# Patient Record
Sex: MALE | Race: WHITE | Employment: UNEMPLOYED | URBAN - METROPOLITAN AREA
[De-identification: names, ages, dates, MRNs, and addresses within clinical notes are randomized per-mention and may not be internally consistent; named-entity substitution may affect disease eponyms.]

---

## 2022-10-01 ENCOUNTER — HOSPITAL ENCOUNTER (EMERGENCY)
Age: 67
Discharge: HOME | End: 2022-10-01
Payer: MEDICARE

## 2022-10-01 VITALS
RESPIRATION RATE: 16 BRPM | WEIGHT: 187.17 LBS | HEART RATE: 78 BPM | BODY MASS INDEX: 26.8 KG/M2 | TEMPERATURE: 98.5 F | OXYGEN SATURATION: 100 % | DIASTOLIC BLOOD PRESSURE: 76 MMHG | HEIGHT: 70 IN | SYSTOLIC BLOOD PRESSURE: 143 MMHG

## 2022-10-01 DIAGNOSIS — U07.1 COVID-19 VIRUS INFECTION: Primary | ICD-10-CM

## 2022-10-01 DIAGNOSIS — E86.0 DEHYDRATION: ICD-10-CM

## 2022-10-01 DIAGNOSIS — R11.2 NAUSEA AND VOMITING, UNSPECIFIED VOMITING TYPE: ICD-10-CM

## 2022-10-01 LAB
RAPID INFLUENZA  B AGN: NEGATIVE
RAPID INFLUENZA A AGN: NEGATIVE
SARS-COV-2, NAAT: DETECTED

## 2022-10-01 PROCEDURE — 99284 EMERGENCY DEPT VISIT MOD MDM: CPT

## 2022-10-01 PROCEDURE — 96361 HYDRATE IV INFUSION ADD-ON: CPT

## 2022-10-01 PROCEDURE — 87635 SARS-COV-2 COVID-19 AMP PRB: CPT

## 2022-10-01 PROCEDURE — 2580000003 HC RX 258: Performed by: PHYSICIAN ASSISTANT

## 2022-10-01 PROCEDURE — 87804 INFLUENZA ASSAY W/OPTIC: CPT

## 2022-10-01 PROCEDURE — 96374 THER/PROPH/DIAG INJ IV PUSH: CPT

## 2022-10-01 PROCEDURE — 96375 TX/PRO/DX INJ NEW DRUG ADDON: CPT

## 2022-10-01 PROCEDURE — 6360000002 HC RX W HCPCS: Performed by: PHYSICIAN ASSISTANT

## 2022-10-01 RX ORDER — METHOCARBAMOL 100 MG/ML
500 INJECTION, SOLUTION INTRAMUSCULAR; INTRAVENOUS ONCE
Status: COMPLETED | OUTPATIENT
Start: 2022-10-01 | End: 2022-10-01

## 2022-10-01 RX ORDER — ONDANSETRON 2 MG/ML
4 INJECTION INTRAMUSCULAR; INTRAVENOUS ONCE
Status: COMPLETED | OUTPATIENT
Start: 2022-10-01 | End: 2022-10-01

## 2022-10-01 RX ORDER — KETOROLAC TROMETHAMINE 30 MG/ML
15 INJECTION, SOLUTION INTRAMUSCULAR; INTRAVENOUS ONCE
Status: COMPLETED | OUTPATIENT
Start: 2022-10-01 | End: 2022-10-01

## 2022-10-01 RX ORDER — NIRMATRELVIR AND RITONAVIR 300-100 MG
KIT ORAL
Qty: 30 TABLET | Refills: 0 | Status: SHIPPED | OUTPATIENT
Start: 2022-10-01 | End: 2022-10-06

## 2022-10-01 RX ORDER — METHOCARBAMOL 500 MG/1
500 TABLET, FILM COATED ORAL EVERY 8 HOURS PRN
Qty: 12 TABLET | Refills: 0 | Status: SHIPPED | OUTPATIENT
Start: 2022-10-01 | End: 2022-10-05

## 2022-10-01 RX ORDER — 0.9 % SODIUM CHLORIDE 0.9 %
1000 INTRAVENOUS SOLUTION INTRAVENOUS ONCE
Status: COMPLETED | OUTPATIENT
Start: 2022-10-01 | End: 2022-10-01

## 2022-10-01 RX ORDER — ONDANSETRON 4 MG/1
4 TABLET, ORALLY DISINTEGRATING ORAL EVERY 8 HOURS PRN
Qty: 20 TABLET | Refills: 0 | Status: SHIPPED | OUTPATIENT
Start: 2022-10-01

## 2022-10-01 RX ADMIN — METHOCARBAMOL 500 MG: 100 INJECTION INTRAMUSCULAR; INTRAVENOUS at 21:46

## 2022-10-01 RX ADMIN — SODIUM CHLORIDE 1000 ML: 9 INJECTION, SOLUTION INTRAVENOUS at 21:48

## 2022-10-01 RX ADMIN — ONDANSETRON 4 MG: 2 INJECTION INTRAMUSCULAR; INTRAVENOUS at 21:45

## 2022-10-01 RX ADMIN — KETOROLAC TROMETHAMINE 15 MG: 30 INJECTION, SOLUTION INTRAMUSCULAR at 21:46

## 2022-10-01 ASSESSMENT — PAIN DESCRIPTION - LOCATION
LOCATION: ABDOMEN
LOCATION: ABDOMEN

## 2022-10-01 ASSESSMENT — PAIN DESCRIPTION - DESCRIPTORS: DESCRIPTORS: PATIENT UNABLE TO DESCRIBE

## 2022-10-01 ASSESSMENT — PAIN SCALES - GENERAL
PAINLEVEL_OUTOF10: 8
PAINLEVEL_OUTOF10: 5

## 2022-10-02 NOTE — DISCHARGE INSTRUCTIONS
Home in stable condition to stay hydrated, use medications as discussed, quarantine, and monitor for gradual improvement. Call your doctor for further care and treatment 24-72 hours. Return to ER for difficulty getting air, unable to take liquids, acute mental status change, or other acute worsening or concern.

## 2022-10-02 NOTE — ED NOTES
Discharge and education instructions reviewed. Patient verbalized understanding, teach-back successful. Patient denied questions at this time. No acute distress noted. Patient instructed to follow-up as noted - return to emergency department if symptoms worsen. Patient verbalized understanding. Discharged per EDMD with discharge instructions.         Evan Solis RN  10/01/22 3432

## 2022-10-02 NOTE — ED PROVIDER NOTES
[address] [facility]        Pt Name: Mary Leigh  MRN: [id number]  [place] 1955  Date of evaluation: 10/1/2022  Provider: Zachary Bird  PCP: [facility]  Note Started: 11:25 PM EDT      JAMILAH. I have evaluated this patient. My supervising physician was available for consultation. Triage CHIEF COMPLAINT       Chief Complaint   Patient presents with    Emesis     Pt had fever yesterday and emesis today. HISTORY OF PRESENT ILLNESS   (Location/Symptom, Timing/Onset, Context/Setting, Quality, Duration, Modifying Factors, Severity)  Note limiting factors. Chief Complaint: Fevers and body aches yesterday, nausea and vomiting throughout the whole day today and feeling dehydrated    Mary Leigh is a 73 y.o. male who presents indicating that he hurts all over including headache and backache and general malaise and fatigue. He is also feeling a bit dehydrated. He has been vomiting nearly constantly throughout the whole day. Finally he found a Zofran tablet and took a just before coming into the ER and is not not actively heaving or vomiting. No significant runny or stuffy nose. No cough or congestion or chest pain or shortness of breath. No syncope or near syncope or extremity acute loss range of motion or strength or sensation or rash    Nursing Notes were all reviewed and agreed with or any disagreements were addressed in the HPI. REVIEW OF SYSTEMS    (2-9 systems for level 4, 10 or more for level 5)     Review of Systems  Positive for symptoms as above with fevers and chills headache, no vision change or neck pain or stiffness. No worst of life headache or sudden onset or thunderclap. No sore throat or any chest pain or shortness of breath dizziness or confusion syncope or near syncope. Positive for nausea and vomiting, no abdominal pain at this time. No acute urine or stool changes noted. No extremity acute weakness or loss of range of motion or strength. No rash. PAST MEDICAL HISTORY     Past Medical History:   Diagnosis Date    Hypertension        SURGICAL HISTORY     Past Surgical History:   Procedure Laterality Date    BACK SURGERY         CURRENTMEDICATIONS       Previous Medications    No medications on file       ALLERGIES     Patient has no known allergies. FAMILYHISTORY     No family history on file. SOCIAL HISTORY       Social History     Socioeconomic History    Marital status: Single       SCREENINGS    [place] Coma Scale  Eye Opening: Spontaneous  Best Verbal Response: Oriented  Best Motor Response: Obeys commands  [place] Coma Scale Score: 15        PHYSICAL EXAM    (up to 7 for level 4, 8 or more for level 5)     ED Triage Vitals [10/01/22 2036]   BP Temp Temp Source Heart Rate Resp SpO2 Height Weight   (!) 181/96 98.5 °F (36.9 °C) Oral 85 (!) 85 95 % 5' 10\" (1.778 m) 187 lb 2.7 oz (84.9 kg)       Physical Exam  Vitals and nursing note reviewed. Constitutional:       Appearance: Normal appearance. He is not diaphoretic. HENT:      Head: Normocephalic and atraumatic. Right Ear: External ear normal.      Left Ear: External ear normal.      Nose: Nose normal.      Mouth/Throat:      Mouth: Mucous membranes are moist.      Pharynx: No posterior oropharyngeal erythema. Eyes:      General:         Right eye: No discharge. Left eye: No discharge. Conjunctiva/sclera: Conjunctivae normal.   Cardiovascular:      Rate and Rhythm: Normal rate and regular rhythm. Pulses: Normal pulses. Heart sounds: Normal heart sounds. No murmur heard. No gallop. Pulmonary:      Effort: Pulmonary effort is normal. No respiratory distress. Breath sounds: Normal breath sounds. No wheezing, rhonchi or rales. Abdominal:      General: Bowel sounds are normal. There is no distension. Palpations: Abdomen is soft. Tenderness:  There is no abdominal tenderness. There is no right CVA tenderness or left CVA tenderness. Musculoskeletal:         General: Normal range of motion. Cervical back: Normal range of motion and neck supple. No rigidity. Right lower leg: No edema. Left lower leg: No edema. Lymphadenopathy:      Cervical: No cervical adenopathy. Skin:     General: Skin is warm and dry. Capillary Refill: Capillary refill takes less than 2 seconds. Neurological:      Mental Status: He is alert and oriented to person, place, and time. Mental status is at baseline. Sensory: No sensory deficit. Coordination: Coordination normal.   Psychiatric:         Mood and Affect: Mood normal.         Behavior: Behavior normal.       DIAGNOSTIC RESULTS   LABS:    Labs Reviewed   COVID-19, RAPID - Abnormal; Notable for the following components:       Result Value    SARS-CoV-2, NAAT DETECTED (*)     All other components within normal limits   RAPID INFLUENZA A/B ANTIGENS       When ordered, only abnormal lab results are displayed. All other labs were within normal range or not returned as of this dictation. EKG: When ordered, EKG's are interpreted by the Emergency Department Physician in the absence of a cardiologist.  Please see their note for interpretation of EKG. RADIOLOGY:   Non-plain film images such as CT, Ultrasound and MRI are read by the radiologist. Plain radiographic images are visualized andpreliminarily interpreted by the  ED Provider with the below findings:        Interpretation perthe Radiologist below, if available at the time of this note:    No orders to display     No results found.       PROCEDURES   Unless otherwise noted below, none     Procedures    CRITICAL CARE TIME   N/A    CONSULTS:  None      EMERGENCY DEPARTMENT COURSE and DIFFERENTIAL DIAGNOSIS/MDM:   Vitals:    Vitals:    10/01/22 2145 10/01/22 2200 10/01/22 2215 10/01/22 2245   BP: (!) 167/78 (!) 149/81 (!) 149/60 (!) 159/83   Pulse: 80 66 66 84 Resp: 14 14 12 16   Temp:       TempSrc:       SpO2: 97% 94% 93% 96%   Weight:       Height:           Patient was given thefollowing medications:  Medications   0.9 % sodium chloride bolus (0 mLs IntraVENous Stopped 10/1/22 2300)   ondansetron (ZOFRAN) injection 4 mg (4 mg IntraVENous Given 10/1/22 2145)   methocarbamol (ROBAXIN) injection 500 mg (500 mg IntraVENous Given 10/1/22 2146)   ketorolac (TORADOL) injection 15 mg (15 mg IntraVENous Given 10/1/22 2146)         Is this patient to be included in the SEP-1 Core Measure due to severe sepsis or septic shock? No   Exclusion criteria - the patient is NOT to be included for SEP-1 Core Measure due to:  2+ SIRS criteria are not met    Patient presents as above and evaluation and treatment is begun here. He does have a COVID-19 swab taken as well as a influenza swab. The COVID-19 swab comes back positive. Patient reportedly has been vomiting all day and is feeling a bit dehydrated. Therefore medications for comfort are ordered for IV as well as some IV fluids. Patient does tolerate this quite well. On recheck patient states that he is feeling significantly better. No longer nauseated. Significantly less pain. Overall he is feeling quite a bit improved. No suspicion or indication of acute system compromise at this time and conservative home care now discussed and recommended to patient and family who verbalized understanding and agreement with the above and the following discharge home plan. Home in stable condition to stay hydrated, use medications as discussed, quarantine, and monitor for gradual improvement. Call your doctor for further care and treatment 24-72 hours. Return to ER for difficulty getting air, unable to take liquids, acute mental status change, or other acute worsening or concern. I am the Primary Clinician of Record. FINAL IMPRESSION      1. COVID-19 virus infection    2. Nausea and vomiting, unspecified vomiting type    3. Dehydration          DISPOSITION/PLAN   DISPOSITION Decision To Discharge 10/01/2022 11:18:46 PM      PATIENT REFERREDTO:  Cherry Greco MD  [place] [address]  [phone]            DISCHARGE MEDICATIONS:  New Prescriptions    METHOCARBAMOL (ROBAXIN) 500 MG TABLET    Take 1 tablet by mouth every 8 hours as needed (spasm) Caution, causes drowsiness. Take appropriate care    NIRMATRELVIR/RITONAVIR (PAXLOVID, 300/100,) 20 X 150 MG & 10 X 100MG TBPK    Take 3 tablets (two 150 mg nirmatrelvir and one 100 mg ritonavir tablets) by mouth every 12 hours for 5 days.     ONDANSETRON (ZOFRAN ODT) 4 MG DISINTEGRATING TABLET    Take 1 tablet by mouth every 8 hours as needed for Nausea       DISCONTINUED MEDICATIONS:  Discontinued Medications    No medications on file              (Please note that portions ofthis note were completed with a voice recognition program.  Efforts were made to edit the dictations but occasionally words are mis-transcribed.)    Libby Jaeger PA-C (electronically signed)              Libby Jaeger PA-C  10/01/22 6213

## 2022-10-02 NOTE — ED TRIAGE NOTES
Pt states that yesterday he has had a fever and puking today. Pt did not test for covid at home. Pt is vaccinated for covid. Pt has back pain that is more painful. Pt is accompanied by former significant other. Pt reluctant to answer questions because he does not feel well. Pt initially did not want Covid swab, but then agreed to swab. Pt denies confusion, numbness, tingling in extremities, but states he feels out of from not feeling well today.

## 2022-11-18 ENCOUNTER — APPOINTMENT (OUTPATIENT)
Dept: GENERAL RADIOLOGY | Age: 67
End: 2022-11-18
Payer: MEDICARE

## 2022-11-18 ENCOUNTER — HOSPITAL ENCOUNTER (EMERGENCY)
Age: 67
Discharge: HOME | End: 2022-11-18
Payer: MEDICARE

## 2022-11-18 VITALS
BODY MASS INDEX: 27.84 KG/M2 | DIASTOLIC BLOOD PRESSURE: 88 MMHG | SYSTOLIC BLOOD PRESSURE: 157 MMHG | HEIGHT: 70 IN | WEIGHT: 194.45 LBS | OXYGEN SATURATION: 97 % | HEART RATE: 79 BPM | TEMPERATURE: 98.1 F | RESPIRATION RATE: 16 BRPM

## 2022-11-18 DIAGNOSIS — S62.114A CLOSED NONDISPLACED FRACTURE OF TRIQUETRUM OF RIGHT WRIST, INITIAL ENCOUNTER: Primary | ICD-10-CM

## 2022-11-18 PROCEDURE — 73130 X-RAY EXAM OF HAND: CPT

## 2022-11-18 PROCEDURE — 29125 APPL SHORT ARM SPLINT STATIC: CPT

## 2022-11-18 PROCEDURE — 73110 X-RAY EXAM OF WRIST: CPT

## 2022-11-18 PROCEDURE — 99283 EMERGENCY DEPT VISIT LOW MDM: CPT

## 2022-11-18 PROCEDURE — 6370000000 HC RX 637 (ALT 250 FOR IP): Performed by: NURSE PRACTITIONER

## 2022-11-18 RX ORDER — HYDROCODONE BITARTRATE AND ACETAMINOPHEN 5; 325 MG/1; MG/1
1 TABLET ORAL ONCE
Status: DISCONTINUED | OUTPATIENT
Start: 2022-11-18 | End: 2022-11-18 | Stop reason: HOSPADM

## 2022-11-18 RX ORDER — NAPROXEN 500 MG/1
500 TABLET ORAL 2 TIMES DAILY
Qty: 28 TABLET | Refills: 0 | Status: SHIPPED | OUTPATIENT
Start: 2022-11-18 | End: 2022-12-02

## 2022-11-18 RX ORDER — NAPROXEN 250 MG/1
500 TABLET ORAL ONCE
Status: COMPLETED | OUTPATIENT
Start: 2022-11-18 | End: 2022-11-18

## 2022-11-18 RX ORDER — HYDROCODONE BITARTRATE AND ACETAMINOPHEN 5; 325 MG/1; MG/1
1 TABLET ORAL EVERY 6 HOURS PRN
Qty: 12 TABLET | Refills: 0 | Status: SHIPPED | OUTPATIENT
Start: 2022-11-18 | End: 2022-11-21

## 2022-11-18 RX ORDER — AMLODIPINE BESYLATE 5 MG/1
2.5 TABLET ORAL
COMMUNITY
Start: 2022-01-06

## 2022-11-18 RX ORDER — GABAPENTIN 300 MG/1
900 CAPSULE ORAL
COMMUNITY
Start: 2022-01-27

## 2022-11-18 RX ORDER — ACETAMINOPHEN 500 MG
1000 TABLET ORAL ONCE
Status: COMPLETED | OUTPATIENT
Start: 2022-11-18 | End: 2022-11-18

## 2022-11-18 RX ORDER — ACETAMINOPHEN 500 MG
1000 TABLET ORAL 3 TIMES DAILY PRN
Qty: 180 TABLET | Refills: 0 | Status: SHIPPED | OUTPATIENT
Start: 2022-11-18 | End: 2022-11-18

## 2022-11-18 RX ADMIN — NAPROXEN 500 MG: 250 TABLET ORAL at 11:18

## 2022-11-18 RX ADMIN — ACETAMINOPHEN 1000 MG: 500 TABLET ORAL at 11:18

## 2022-11-18 ASSESSMENT — PAIN - FUNCTIONAL ASSESSMENT: PAIN_FUNCTIONAL_ASSESSMENT: 0-10

## 2022-11-18 ASSESSMENT — PAIN SCALES - GENERAL
PAINLEVEL_OUTOF10: 4
PAINLEVEL_OUTOF10: 4
PAINLEVEL_OUTOF10: 6

## 2022-11-18 ASSESSMENT — PAIN DESCRIPTION - LOCATION
LOCATION: WRIST
LOCATION: ARM;WRIST

## 2022-11-18 ASSESSMENT — LIFESTYLE VARIABLES
HOW MANY STANDARD DRINKS CONTAINING ALCOHOL DO YOU HAVE ON A TYPICAL DAY: PATIENT DOES NOT DRINK
HOW OFTEN DO YOU HAVE A DRINK CONTAINING ALCOHOL: NEVER

## 2022-11-18 ASSESSMENT — PAIN DESCRIPTION - ORIENTATION
ORIENTATION: RIGHT
ORIENTATION: RIGHT

## 2022-11-18 NOTE — DISCHARGE INSTRUCTIONS
You have been prescribed medication, norco, that causes drowsiness. While this is not a problem under normal circumstances, when taken with alcohol or while driving or operating heavy machinery, this medicine could cause you to become too sleepy and/or hurt yourself or others. Therefore, it is very important that you DO NOT DRIVE, DRINK ALCOHOL, OR OPERATE HEAVY MACHINERY WHILE TAKING THE MEDICINE(S) LISTED ABOVE.

## 2022-11-18 NOTE — ED PROVIDER NOTES
[address]  [address] [address]  Dept: [phone]  Loc: [address] ENCOUNTER        This patient was not seen or evaluated by the attending physician. I evaluated this patient, the attending physician was available for consultation. CHIEF COMPLAINT    Chief Complaint   Patient presents with    Wrist Pain     4:00 pm yesterday was doing brakes on truck, tripped of equipment and caught himself with his hands and hurt his wrists, mainly right wrist. Barely scraped nose on concrete, denies taking blood thinners. HPI    Allegra Herrera is a 73 y.o. male who presents with right wrist and hand pain. The onset was yesterday. The duration has been constant since the onset. The quality of the pain is sharp and the severity is 4/10. The patient has no other associated injury. The context is states that he was chasing his dog that got out of the house, he tripped over a car nelda and fell with his hands extended outward. He states that \"my right side took the brunt of it. \"  Did not hit his head or lose consciousness. Has pain in the right wrist and hand. No numbness or tingling distal to site of injury. Came to the ED for further evaluation and treatment    REVIEW OF SYSTEMS    Skin: No lacerations or puncture wounds  Musculoskeletal: see HPI, no other joint or bony injury or pain  Neurologic: No loss of consciousness, no head injury, no paresthesias or focal distal extremity weakness  All other systems reviewed and are negative.     PAST MEDICAL & SURGICAL HISTORY    Past Medical History:   Diagnosis Date    Hypertension      Past Surgical History:   Procedure Laterality Date    BACK SURGERY         CURRENT MEDICATIONS  (may include discharge medications prescribed in the ED)  Current Outpatient Rx   Medication Sig Dispense Refill    amLODIPine (NORVASC) 5 MG tablet 2.5 mg      gabapentin (NEURONTIN) 300 MG capsule 900 mg.      naproxen (NAPROSYN) 500 MG tablet Take 1 tablet by mouth 2 times daily for 14 days 28 tablet 0    HYDROcodone-acetaminophen (NORCO) 5-325 MG per tablet Take 1 tablet by mouth every 6 hours as needed for Pain for up to 3 days. Intended supply: 3 days. Take lowest dose possible to manage pain 12 tablet 0       ALLERGIES    Allergies   Allergen Reactions    Lisinopril      Other reaction(s): Mood swings    Oxycodone-Acetaminophen      Other reaction(s): Weight gain    Pregabalin      Other reaction(s): Mood swings, Depressive disorder, Mood swings, Depressive disorder    Codeine Itching       SOCIAL & FAMILY HISTORY    Social History     Socioeconomic History    Marital status: Single     Spouse name: None    Number of children: None    Years of education: None    Highest education level: None   Tobacco Use    Smoking status: Never    Smokeless tobacco: Never     History reviewed. No pertinent family history. PHYSICAL EXAM    VITAL SIGNS: BP (!) 157/88   Pulse 79   Temp 98.1 °F (36.7 °C) (Oral)   Resp 16   Ht 5' 10\" (1.778 m)   Wt 194 lb 7.1 oz (88.2 kg)   SpO2 97%   BMI 27.90 kg/m²   Constitutional:  Well nourished, no acute distress  HENT:  Atraumatic, moist mucous membranes  NECK: normal range of motion,  supple   Respiratory:  No respiratory distress  Cardiovascular:  No JVD  Vascular: right radial pulse 2+, capillary refill less than 2 seconds  Musculoskeletal:  +\ tenderness to palpation of the ulnar aspect of the right wrist with overlying right wrist edema and right hand edema. No overt deformity   integument:  Well hydrated, no skin lacerations, no erythema  Neurologic:  Awake alert, no slurred speech, sensory and motor intact    RADIOLOGY   XR HAND RIGHT (MIN 3 VIEWS)   Final Result   Suspected pattern of triquetral fracture appreciated on lateral imaging of   the wrist.  Probable associated soft tissue swelling.          XR WRIST RIGHT (MIN 3 VIEWS)   Final Result   Suspected pattern of triquetral fracture appreciated on lateral imaging of   the wrist.  Probable associated soft tissue swelling. PROCEDURES  SPLINT PLACEMENT  A volar OCL splint was applied to the affected limb by the emergency department technician. I evaluated the splint after it was applied. The splint was in good position. The patient's extremity was neurovascularly intact after the splint placement. ED COURSE & MEDICAL DECISION MAKING   See chart for medications given during emergency department course. Differential diagnosis: Arterial Injury/Ischemia, Fracture, Dislocation, Infection, Compartment Syndrome, Neurologic Deficit/Injury, ligamental injury, musculoskeletal pain, other    No evidence of neurovascular injury on exam.  Plain films as above. I spoke with Lu, NP with the orthopedic group. Deferring CT until follow-up. We will place a volar splint, see above, patient tolerated well no immediate complication is neurovascularly intact after splint placement. No results found for this visit on 11/18/22. I estimate there is LOW risk for COMPARTMENT SYNDROME, DEEP VENOUS THROMBOSIS, SEPTIC ARTHRITIS, TENDON OR NEUROVASCULAR INJURY, thus I consider the discharge disposition reasonable. Jerome Young and I have discussed the diagnosis and risks, and we agree with discharging home to follow-up with their primary doctor or the referral orthopedist. We also discussed returning to the Emergency Department immediately if new or worsening symptoms occur. We have discussed the symptoms which are most concerning (e.g., changing or worsening pain, numbness, weakness) that necessitate immediate return. The patient verbalized understanding, have no further questions or concerns and are in agreement with the plan of discharge. Blood pressure (!) 157/88, pulse 79, temperature 98.1 °F (36.7 °C), temperature source Oral, resp.  rate 16, height 5' 10\" (1.778 m), weight 194 lb 7.1 oz (88.2 kg), SpO2 97 %. FINAL IMPRESSION    1.  Closed nondisplaced fracture of triquetrum of right wrist, initial encounter        PLAN  Discharge with outpatient follow-up and discharge instructions (see EMR)    (Please note that this note was completed with a voice recognition program.  Every attempt was made to edit the dictations, but inevitably there remain words that are mis-transcribed.)          Colby Shone, BRIAN - CNP  11/18/22 2540

## 2022-11-18 NOTE — ED TRIAGE NOTES
4:00 pm yesterday was doing brakes on truck, tripped of equipment and caught himself with his hands and hurt his wrists, mainly right wrist. Barely scraped nose on concrete, denies taking blood thinners. Sitting up with easy breathing.

## 2022-11-21 ENCOUNTER — OFFICE VISIT (OUTPATIENT)
Dept: ORTHOPEDIC SURGERY | Age: 67
End: 2022-11-21
Payer: MEDICARE

## 2022-11-21 VITALS — RESPIRATION RATE: 16 BRPM | BODY MASS INDEX: 28.63 KG/M2 | WEIGHT: 200 LBS | HEIGHT: 70 IN

## 2022-11-21 DIAGNOSIS — S62.114A CLOSED NONDISPLACED FRACTURE OF TRIQUETRUM OF RIGHT WRIST, INITIAL ENCOUNTER: Primary | ICD-10-CM

## 2022-11-21 PROCEDURE — 1123F ACP DISCUSS/DSCN MKR DOCD: CPT | Performed by: STUDENT IN AN ORGANIZED HEALTH CARE EDUCATION/TRAINING PROGRAM

## 2022-11-21 PROCEDURE — 1036F TOBACCO NON-USER: CPT | Performed by: STUDENT IN AN ORGANIZED HEALTH CARE EDUCATION/TRAINING PROGRAM

## 2022-11-21 PROCEDURE — G8484 FLU IMMUNIZE NO ADMIN: HCPCS | Performed by: STUDENT IN AN ORGANIZED HEALTH CARE EDUCATION/TRAINING PROGRAM

## 2022-11-21 PROCEDURE — G8417 CALC BMI ABV UP PARAM F/U: HCPCS | Performed by: STUDENT IN AN ORGANIZED HEALTH CARE EDUCATION/TRAINING PROGRAM

## 2022-11-21 PROCEDURE — 3017F COLORECTAL CA SCREEN DOC REV: CPT | Performed by: STUDENT IN AN ORGANIZED HEALTH CARE EDUCATION/TRAINING PROGRAM

## 2022-11-21 PROCEDURE — 99203 OFFICE O/P NEW LOW 30 MIN: CPT | Performed by: STUDENT IN AN ORGANIZED HEALTH CARE EDUCATION/TRAINING PROGRAM

## 2022-11-21 PROCEDURE — G8427 DOCREV CUR MEDS BY ELIG CLIN: HCPCS | Performed by: STUDENT IN AN ORGANIZED HEALTH CARE EDUCATION/TRAINING PROGRAM

## 2022-11-21 PROCEDURE — L3908 WHO COCK-UP NONMOLDE PRE OTS: HCPCS | Performed by: STUDENT IN AN ORGANIZED HEALTH CARE EDUCATION/TRAINING PROGRAM

## 2022-11-21 NOTE — PROGRESS NOTES
CHIEF COMPLAINT: Right wrist pain    DATE OF INJURY: 11/17/22    History:   Mr. Paz Knox 73 y.o. right handed male presents today for the first visit for evaluation of a right wrist pain / injury. The patient was referred by the ED. This is evaluated as a personal injury. The pain began 4 days ago. He rates pain at 6/10. There was a history of injury. He tripped over a jack handle and fell on an outstretched hand. He was evaluated with XR in the ED and splinted and asked to follow up with orthopedics. The patient has taken NSAIDs. The patient's occupation is retired. Outside reports reviewed: ER records. Past Medical History:   Diagnosis Date    Hypertension        Past Surgical History:   Procedure Laterality Date    BACK SURGERY         Current Outpatient Medications on File Prior to Visit   Medication Sig Dispense Refill    amLODIPine (NORVASC) 5 MG tablet 2.5 mg      gabapentin (NEURONTIN) 300 MG capsule 900 mg.      naproxen (NAPROSYN) 500 MG tablet Take 1 tablet by mouth 2 times daily for 14 days 28 tablet 0    HYDROcodone-acetaminophen (NORCO) 5-325 MG per tablet Take 1 tablet by mouth every 6 hours as needed for Pain for up to 3 days. Intended supply: 3 days. Take lowest dose possible to manage pain 12 tablet 0     No current facility-administered medications on file prior to visit.        Allergies   Allergen Reactions    Lisinopril      Other reaction(s): Mood swings    Oxycodone-Acetaminophen      Other reaction(s): Weight gain    Pregabalin      Other reaction(s): Mood swings, Depressive disorder, Mood swings, Depressive disorder    Codeine Itching       Social History     Socioeconomic History    Marital status: Single     Spouse name: Not on file    Number of children: Not on file    Years of education: Not on file    Highest education level: Not on file   Occupational History    Not on file   Tobacco Use    Smoking status: Never    Smokeless tobacco: Never   Substance and Sexual Activity Alcohol use: Not on file    Drug use: Not on file    Sexual activity: Not on file   Other Topics Concern    Not on file   Social History Narrative    Not on file     Social Determinants of Health     Financial Resource Strain: Not on file   Food Insecurity: Not on file   Transportation Needs: Not on file   Physical Activity: Not on file   Stress: Not on file   Social Connections: Not on file   Intimate Partner Violence: Not on file   Housing Stability: Not on file       No family history on file. Review of Systems:  I have reviewed the clinically relevant past medical history, medications, allergies, family history, social history, and 13 point Review of Systems from the patient's recent history form & documented any details relevant to today's presenting complaints in the history above. The patient's self-reported past medical history, medications, allergies, family history, social history, and Review of Systems form from 11/21/22 have been scanned into the chart under the \"Media\" tab. Physical Examination:     Mr. Alexys Ramey is a pleasant 73 y.o. male who presents today in no acute distress, awake, alert, and oriented. Vitals:    11/21/22 1451   Resp: 16         There is swelling that can be seen, no change in the color. He has intact sensation to light touch throughout the bilateral hands in the median, radial, and ulnar nerve distribution and good radial pulses. EPL/FPL/Interossei are intact bilaterally. He is moderately tender at the dorsal wrist over the area of the triquetrum. Non tender to the remainder of the wrist and hand. IMAGING:  Right wrist Xray's:  3 views reviewed demonstrating   Osseous density projecting along the dorsal aspect of the wrist with   appearance suspicious for a possible fracture of the triquetrum. No other   skeletal abnormality is appreciated in the right hand or wrist.  Mild pattern   of degenerative change at the 1st carpometacarpal joint.   There does appear   to be dorsal soft tissue swelling about the wrist.         Assessment:     Right wrist Fracture of Triquetrum       Plan:     Natural history and expected course discussed. Questions answered. We discussed that the fracture can be treated nonoperatively. Brace and non weight bearing for the next 4 weeks. Procedures    Jaky Abdalla Titan Wrist Short Brace     Patient was prescribed a Jaky Abdalla Titan Wrist Orthosis. The right wrist will require stabilization / immobilization from this semi-rigid / rigid orthosis to improve their function. The orthosis will assist in protecting the affected area, provide functional support and facilitate healing. The patient was educated and fit by a healthcare professional with expert knowledge and specialization in brace application while under the direct supervision of the treating physician. Verbal and written instructions for the use of and application of this item were provided. They were instructed to contact the office immediately should the brace result in increased pain, decreased sensation, increased swelling or worsening of the condition. NSAID's or tylenol as needed. Follow up in 4 weeks. Connor Whitfield PA-C  Board Certified by the [employer] on Certification of [address] and [address]: This note was generated with use of a verbal recognition program and an attempt was made to check for errors. It is possible that there are still dictated errors within this office note. If so, please bring any significant errors to my attention for an addendum. All efforts were made to ensure that this office note is accurate.

## 2022-12-13 ENCOUNTER — APPOINTMENT (OUTPATIENT)
Dept: GENERAL RADIOLOGY | Age: 67
End: 2022-12-13
Payer: MEDICARE

## 2022-12-13 ENCOUNTER — HOSPITAL ENCOUNTER (EMERGENCY)
Age: 67
Discharge: HOME | End: 2022-12-13
Payer: MEDICARE

## 2022-12-13 ENCOUNTER — NURSE TRIAGE (OUTPATIENT)
Dept: OTHER | Facility: CLINIC | Age: 67
End: 2022-12-13

## 2022-12-13 VITALS
RESPIRATION RATE: 20 BRPM | OXYGEN SATURATION: 100 % | TEMPERATURE: 98.5 F | WEIGHT: 200.84 LBS | HEART RATE: 78 BPM | DIASTOLIC BLOOD PRESSURE: 61 MMHG | SYSTOLIC BLOOD PRESSURE: 175 MMHG | BODY MASS INDEX: 28.82 KG/M2

## 2022-12-13 DIAGNOSIS — I10 ESSENTIAL HYPERTENSION: Primary | ICD-10-CM

## 2022-12-13 LAB
A/G RATIO: 1.4 (ref 1.1–2.2)
ALBUMIN SERPL-MCNC: 4.3 G/DL (ref 3.4–5)
ALP BLD-CCNC: 78 U/L (ref 40–129)
ALT SERPL-CCNC: 13 U/L (ref 10–40)
ANION GAP SERPL CALCULATED.3IONS-SCNC: 8 MMOL/L (ref 3–16)
AST SERPL-CCNC: 17 U/L (ref 15–37)
BASOPHILS ABSOLUTE: 0 K/UL (ref 0–0.2)
BASOPHILS RELATIVE PERCENT: 0.7 %
BILIRUB SERPL-MCNC: <0.2 MG/DL (ref 0–1)
BUN BLDV-MCNC: 13 MG/DL (ref 7–20)
CALCIUM SERPL-MCNC: 9.5 MG/DL (ref 8.3–10.6)
CHLORIDE BLD-SCNC: 101 MMOL/L (ref 99–110)
CO2: 29 MMOL/L (ref 21–32)
CREAT SERPL-MCNC: 0.5 MG/DL (ref 0.8–1.3)
EOSINOPHILS ABSOLUTE: 0.3 K/UL (ref 0–0.6)
EOSINOPHILS RELATIVE PERCENT: 5.6 %
GFR SERPL CREATININE-BSD FRML MDRD: >60 ML/MIN/{1.73_M2}
GLUCOSE BLD-MCNC: 79 MG/DL (ref 70–99)
HCT VFR BLD CALC: 44.4 % (ref 40.5–52.5)
HEMOGLOBIN: 15.1 G/DL (ref 13.5–17.5)
LYMPHOCYTES ABSOLUTE: 1.5 K/UL (ref 1–5.1)
LYMPHOCYTES RELATIVE PERCENT: 27.6 %
MCH RBC QN AUTO: 32 PG (ref 26–34)
MCHC RBC AUTO-ENTMCNC: 34 G/DL (ref 31–36)
MCV RBC AUTO: 94.1 FL (ref 80–100)
MONOCYTES ABSOLUTE: 0.5 K/UL (ref 0–1.3)
MONOCYTES RELATIVE PERCENT: 9 %
NEUTROPHILS ABSOLUTE: 3.1 K/UL (ref 1.7–7.7)
NEUTROPHILS RELATIVE PERCENT: 57.1 %
PDW BLD-RTO: 13.7 % (ref 12.4–15.4)
PLATELET # BLD: 260 K/UL (ref 135–450)
PMV BLD AUTO: 7.5 FL (ref 5–10.5)
POTASSIUM SERPL-SCNC: 3.9 MMOL/L (ref 3.5–5.1)
RBC # BLD: 4.71 M/UL (ref 4.2–5.9)
SODIUM BLD-SCNC: 138 MMOL/L (ref 136–145)
TOTAL PROTEIN: 7.3 G/DL (ref 6.4–8.2)
TROPONIN: <0.01 NG/ML
WBC # BLD: 5.5 K/UL (ref 4–11)

## 2022-12-13 PROCEDURE — 71046 X-RAY EXAM CHEST 2 VIEWS: CPT

## 2022-12-13 PROCEDURE — 84484 ASSAY OF TROPONIN QUANT: CPT

## 2022-12-13 PROCEDURE — 85025 COMPLETE CBC W/AUTO DIFF WBC: CPT

## 2022-12-13 PROCEDURE — 80053 COMPREHEN METABOLIC PANEL: CPT

## 2022-12-13 PROCEDURE — 99285 EMERGENCY DEPT VISIT HI MDM: CPT

## 2022-12-13 RX ORDER — AMLODIPINE BESYLATE 5 MG/1
2.5 TABLET ORAL DAILY
Qty: 15 TABLET | Refills: 0 | Status: SHIPPED | OUTPATIENT
Start: 2022-12-13 | End: 2023-01-12

## 2022-12-13 ASSESSMENT — PAIN - FUNCTIONAL ASSESSMENT
PAIN_FUNCTIONAL_ASSESSMENT: NONE - DENIES PAIN
PAIN_FUNCTIONAL_ASSESSMENT: NONE - DENIES PAIN

## 2022-12-13 NOTE — ED PROVIDER NOTES
[address]  [address] [address]  Dept: [phone]  Loc: [address] ENCOUNTER        This patient was not seen or evaluated by the attending physician. I evaluated this patient, the attending physician was available for consultation. CHIEF COMPLAINT    Chief Complaint   Patient presents with    Hypertension     Pt states that he is on medication for HTN and he took \"extra\" today for high blood pressure pt states that the blood pressure was \"200'2\"     175/90 upon arrival        HPI    Mitchell Seaman is a 73 y.o. male who presents to the emergency department with concerns that he had a high blood pressure of over 200/100 while at home. Patient states he \"felt funny\" earlier today and then developed a headache. He denies visual disturbances such as blurry vision or double vision. He checked his blood pressure and it was elevated to 200 and something over 100 and something. He does have a history of hypertension. He takes amlodipine. He took an extra half dose of his amlodipine. On arrival he denies lightheadedness, dizziness, chest pain, shortness of breath, nausea or vomiting. He states he took Tylenol earlier for the headache and that it is subsiding. He states that he had been going to the [facility] but when he turned 66 he had to start paying for it so he basically lost his primary care provider and he has not been seen by a physician since then. REVIEW OF SYSTEMS    Neurologic: No headache or dizziness or blurry vision  Cardiac: No chest pain or palpitations  Respiratory: No shortness of breath   General: No fevers  : No hematuria  GI: No nausea or vomiting or abdominal pain  See HPI for further details. All other systems reviewed and are negative.     PAST MEDICAL OR SURGICAL HISTORY    Past Medical History:   Diagnosis Date    Hypertension      Past Surgical History:   Procedure Laterality Date    BACK SURGERY         CURRENT MEDICATIONS    Current Outpatient Rx   Medication Sig Dispense Refill    amLODIPine (NORVASC) 5 MG tablet Take 0.5 tablets by mouth daily 15 tablet 0    gabapentin (NEURONTIN) 300 MG capsule 900 mg.      naproxen (NAPROSYN) 500 MG tablet Take 1 tablet by mouth 2 times daily for 14 days 28 tablet 0       ALLERGIES    Allergies   Allergen Reactions    Lisinopril      Other reaction(s): Mood swings    Oxycodone-Acetaminophen      Other reaction(s): Weight gain    Pregabalin      Other reaction(s): Mood swings, Depressive disorder, Mood swings, Depressive disorder    Codeine Itching       FAMILY OR SOCIAL HISTORY    No family history on file. Social History     Socioeconomic History    Marital status: Single   Tobacco Use    Smoking status: Never    Smokeless tobacco: Never       PHYSICAL EXAM    VITAL SIGNS: BP (!) 175/61   Pulse 78   Temp 98.5 °F (36.9 °C) (Oral)   Resp 20   Wt 200 lb 13.4 oz (91.1 kg)   SpO2 100%   BMI 28.82 kg/m²   Constitutional:  Well developed, well nourished, no acute distress  Eyes:  Pupils equally round and reactive to light, sclera nonicteric  HENT:  Atraumatic  NECK: Normal range of motion, no JVD  Respiratory: Lungs clear to auscultation bilaterally, no wheezing, no respiratory distress  Cardiovascular:  regular rate, normal rhythm, no murmurs   GI:  Soft, nondistended, normal bowel sounds, nontender, no pulsatile masses  Musculoskeletal:  No edema, no acute deformities   Integument:  Skin is warm and dry, no obvious rash    Vascular: Radial pulses 2+ equal bilaterally  Neurologic:  Awake, alert, oriented, no aphasia, no slurred speech, CN II-XII intact    EKG    Reticular rate 74 bpm, parable 142 ms, QRS duration 96 ms, QTc 417 ms  ST elevation or depression. Interpretation is a normal sinus rhythm.     RADIOLOGY/PROCEDURES    XR CHEST (2 VW)   Final Result   No acute cardiopulmonary findings           Labs Reviewed   COMPREHENSIVE METABOLIC PANEL - Abnormal; Notable for the following components:       Result Value    Creatinine 0.5 (*)     All other components within normal limits   CBC WITH AUTO DIFFERENTIAL   TROPONIN       ED COURSE & MEDICAL DECISION MAKING    Pertinent studies reviewed and interpreted. (See chart for details)  See chart for details of medications prescribed. Vitals:    12/13/22 1813 12/13/22 1919   BP: (!) 175/90 (!) 175/61   Pulse: 78    Resp: 20    Temp: 98.5 °F (36.9 °C)    TempSrc: Oral    SpO2: 100%    Weight: 200 lb 13.4 oz (91.1 kg)      Medications - No data to display    I have seen and evaluated this patient. My attending physician was available for consultation    Differential diagnosis: Asymptomatic hypertension, hypertensive urgency, hypertensive emergency, hypertension encephalopathy, acute coronary syndrome, acute renal failure, Thrombotic Stroke, Embolic Stroke, Hemorrhagic Stroke, pain or vertigo or other medical problems elevating the blood pressure secondarily which is a normal physiologic response, other    Patient is afebrile and nontoxic in appearance. Blood pressure is elevated at 175/90. Patient is asymptomatic currently with headache resolved. No evidence of hypertensive urgency or emergency. He denies chest pain, shortness of breath, lightheadedness, dizziness. Other than the headache he was vague in his symptoms stating he just did not feel right. His work-up was reassuring with an unremarkable EKG. Unremarkable CBC, CMP, troponin less than 0.01. LFTs are unremarkable chest x-ray interpreted by radiology and reviewed by myself showed no acute cardiopulmonary process. He is neurologically intact. He is ambulating in the department with a steady gait. He denies chest pain or visual disturbances. He is having issues getting into the [facility].  He was placed on the no urgent PCP list for follow-up.   I instructed him to check his blood pressures once a day in the morning and to write these numbers in a notebook to take to his next appointment. He is on a very low-dose of amlodipine and he may need to have his dose adjusted but I explained to him that his primary care provider will do this. The patient was instructed to follow up as an outpatient in 2 days with primary care. The patient was instructed to return to the ED immediately for any new or worsening symptoms. The patient verbalized understanding. FINAL IMPRESSION    1.  Essential hypertension        PLAN  Discharge instructions and specific outpatient followup      (Please note that this note was completed with a voice recognition program.  Every attempt was made to edit the dictations, but inevitably there remain words that are mis-transcribed.)          Macy Jo, BRIAN - CNP  12/13/22 1946

## 2022-12-14 LAB
EKG ATRIAL RATE: 74 BPM
EKG DIAGNOSIS: NORMAL
EKG P AXIS: 40 DEGREES
EKG P-R INTERVAL: 142 MS
EKG Q-T INTERVAL: 376 MS
EKG QRS DURATION: 96 MS
EKG QTC CALCULATION (BAZETT): 417 MS
EKG R AXIS: 49 DEGREES
EKG T AXIS: 34 DEGREES
EKG VENTRICULAR RATE: 74 BPM

## 2022-12-14 NOTE — DISCHARGE INSTRUCTIONS
Check your blood pressure once a day only in the morning. Write your blood pressure readings in a notebook and take them to your next primary care physician appointment. You may need an adjustment on your dosage of your amlodipine.

## 2022-12-19 ENCOUNTER — OFFICE VISIT (OUTPATIENT)
Dept: ORTHOPEDIC SURGERY | Age: 67
End: 2022-12-19
Payer: MEDICARE

## 2022-12-19 VITALS — WEIGHT: 197 LBS | HEIGHT: 70 IN | RESPIRATION RATE: 16 BRPM | BODY MASS INDEX: 28.2 KG/M2

## 2022-12-19 DIAGNOSIS — S63.501A SPRAIN OF RIGHT WRIST, INITIAL ENCOUNTER: Primary | ICD-10-CM

## 2022-12-19 PROCEDURE — 3017F COLORECTAL CA SCREEN DOC REV: CPT | Performed by: STUDENT IN AN ORGANIZED HEALTH CARE EDUCATION/TRAINING PROGRAM

## 2022-12-19 PROCEDURE — 1036F TOBACCO NON-USER: CPT | Performed by: STUDENT IN AN ORGANIZED HEALTH CARE EDUCATION/TRAINING PROGRAM

## 2022-12-19 PROCEDURE — 99213 OFFICE O/P EST LOW 20 MIN: CPT | Performed by: STUDENT IN AN ORGANIZED HEALTH CARE EDUCATION/TRAINING PROGRAM

## 2022-12-19 PROCEDURE — G8427 DOCREV CUR MEDS BY ELIG CLIN: HCPCS | Performed by: STUDENT IN AN ORGANIZED HEALTH CARE EDUCATION/TRAINING PROGRAM

## 2022-12-19 PROCEDURE — G8417 CALC BMI ABV UP PARAM F/U: HCPCS | Performed by: STUDENT IN AN ORGANIZED HEALTH CARE EDUCATION/TRAINING PROGRAM

## 2022-12-19 PROCEDURE — 1123F ACP DISCUSS/DSCN MKR DOCD: CPT | Performed by: STUDENT IN AN ORGANIZED HEALTH CARE EDUCATION/TRAINING PROGRAM

## 2022-12-19 PROCEDURE — G8484 FLU IMMUNIZE NO ADMIN: HCPCS | Performed by: STUDENT IN AN ORGANIZED HEALTH CARE EDUCATION/TRAINING PROGRAM

## 2022-12-19 NOTE — PROGRESS NOTES
CHIEF COMPLAINT: Right wrist pain    DATE OF INJURY: 11/17/22    Initial History:   Mr. Rik Castleman 78 y.o. right handed male presents today for the first visit for evaluation of a right wrist pain / injury. The patient was referred by the ED. This is evaluated as a personal injury. The pain began 4 days ago. He rates pain at 6/10. There was a history of injury. He tripped over a nelda handle and fell on an outstretched hand. He was evaluated with XR in the ED and splinted and asked to follow up with orthopedics. The patient has taken NSAIDs. The patient's occupation is retired. Interval history: The patient presents for follow up of wrist pain. He has been wearing the brace. He rates pain 2/29 only with certain movements such as washing dishes. He points to pain over the lateral extensor tendons. Past Medical History:   Diagnosis Date    Hypertension        Past Surgical History:   Procedure Laterality Date    BACK SURGERY         Current Outpatient Medications on File Prior to Visit   Medication Sig Dispense Refill    amLODIPine (NORVASC) 5 MG tablet Take 0.5 tablets by mouth daily 15 tablet 0    gabapentin (NEURONTIN) 300 MG capsule 900 mg.      naproxen (NAPROSYN) 500 MG tablet Take 1 tablet by mouth 2 times daily for 14 days 28 tablet 0     No current facility-administered medications on file prior to visit.        Allergies   Allergen Reactions    Lisinopril      Other reaction(s): Mood swings    Oxycodone-Acetaminophen      Other reaction(s): Weight gain    Pregabalin      Other reaction(s): Mood swings, Depressive disorder, Mood swings, Depressive disorder    Codeine Itching       Social History     Socioeconomic History    Marital status: Single     Spouse name: Not on file    Number of children: Not on file    Years of education: Not on file    Highest education level: Not on file   Occupational History    Not on file   Tobacco Use    Smoking status: Never    Smokeless tobacco: Never   Substance and Sexual Activity    Alcohol use: Not on file    Drug use: Not on file    Sexual activity: Not on file   Other Topics Concern    Not on file   Social History Narrative    Not on file     Social Determinants of Health     Financial Resource Strain: Not on file   Food Insecurity: Not on file   Transportation Needs: Not on file   Physical Activity: Not on file   Stress: Not on file   Social Connections: Not on file   Intimate Partner Violence: Not on file   Housing Stability: Not on file       History reviewed. No pertinent family history. Review of Systems:  I have reviewed the clinically relevant past medical history, medications, allergies, family history, social history, and 13 point Review of Systems from the patient's recent history form & documented any details relevant to today's presenting complaints in the history above. The patient's self-reported past medical history, medications, allergies, family history, social history, and Review of Systems form from 11/21/22 have been scanned into the chart under the \"Media\" tab. Physical Examination:     Mr. Devante Ochoa is a pleasant 78 y.o. male who presents today in no acute distress, awake, alert, and oriented. Vitals:    12/19/22 1131   Resp: 16           There is swelling that can be seen, no change in the color. He has intact sensation to light touch throughout the bilateral hands in the median, radial, and ulnar nerve distribution and good radial pulses. EPL/FPL/Interossei are intact bilaterally. He is moderately tender at the dorsal wrist over the area of the triquetrum. Non tender to the remainder of the wrist and hand. IMAGING:  Right wrist Xray's:  3 views reviewed demonstrating   Osseous density projecting along the dorsal aspect of the wrist with   appearance suspicious for a possible fracture of the triquetrum.   No other   skeletal abnormality is appreciated in the right hand or wrist.  Mild pattern   of degenerative change at the 1st carpometacarpal joint. There does appear   to be dorsal soft tissue swelling about the wrist.         Assessment:     Right wrist sprain       Plan:     Natural history and expected course discussed. Questions answered. We discussed that he can begin to wean from the brace. I think the pain he is experiencing is due to wrist sprain rather than what the ED suspected to be a fracture. His pain is diffuse along the lateral wrist extensors and worse with passive wrist flexion. HEP provided. NSAID's or tylenol as needed. Follow up in 4 weeks. Radha Funk PA-C  Board Certified by the [employer] on Certification of [address]: This note was generated with use of a verbal recognition program and an attempt was made to check for errors. It is possible that there are still dictated errors within this office note. If so, please bring any significant errors to my attention for an addendum. All efforts were made to ensure that this office note is accurate.

## 2023-01-23 ENCOUNTER — OFFICE VISIT (OUTPATIENT)
Dept: ORTHOPEDIC SURGERY | Age: 68
End: 2023-01-23
Payer: MEDICARE

## 2023-01-23 VITALS — RESPIRATION RATE: 16 BRPM | HEIGHT: 70 IN | WEIGHT: 199 LBS | BODY MASS INDEX: 28.49 KG/M2

## 2023-01-23 DIAGNOSIS — S63.501D SPRAIN OF RIGHT WRIST, SUBSEQUENT ENCOUNTER: Primary | ICD-10-CM

## 2023-01-23 PROCEDURE — 1036F TOBACCO NON-USER: CPT | Performed by: STUDENT IN AN ORGANIZED HEALTH CARE EDUCATION/TRAINING PROGRAM

## 2023-01-23 PROCEDURE — 3017F COLORECTAL CA SCREEN DOC REV: CPT | Performed by: STUDENT IN AN ORGANIZED HEALTH CARE EDUCATION/TRAINING PROGRAM

## 2023-01-23 PROCEDURE — G8417 CALC BMI ABV UP PARAM F/U: HCPCS | Performed by: STUDENT IN AN ORGANIZED HEALTH CARE EDUCATION/TRAINING PROGRAM

## 2023-01-23 PROCEDURE — G8484 FLU IMMUNIZE NO ADMIN: HCPCS | Performed by: STUDENT IN AN ORGANIZED HEALTH CARE EDUCATION/TRAINING PROGRAM

## 2023-01-23 PROCEDURE — 99213 OFFICE O/P EST LOW 20 MIN: CPT | Performed by: STUDENT IN AN ORGANIZED HEALTH CARE EDUCATION/TRAINING PROGRAM

## 2023-01-23 PROCEDURE — G8427 DOCREV CUR MEDS BY ELIG CLIN: HCPCS | Performed by: STUDENT IN AN ORGANIZED HEALTH CARE EDUCATION/TRAINING PROGRAM

## 2023-01-23 PROCEDURE — 1123F ACP DISCUSS/DSCN MKR DOCD: CPT | Performed by: STUDENT IN AN ORGANIZED HEALTH CARE EDUCATION/TRAINING PROGRAM

## 2023-01-23 NOTE — PROGRESS NOTES
CHIEF COMPLAINT: Right wrist pain    DATE OF INJURY: 11/17/22    Initial History:   Mr. King Brood 78 y.o. right handed male presents today for the first visit for evaluation of a right wrist pain / injury. The patient was referred by the ED. This is evaluated as a personal injury. The pain began 4 days ago. He rates pain at 6/10. There was a history of injury. He tripped over a nelda handle and fell on an outstretched hand. He was evaluated with XR in the ED and splinted and asked to follow up with orthopedics. The patient has taken NSAIDs. The patient's occupation is retired. Interval history: The patient presents for follow up of wrist pain. He has been lifting more and not wearing the brace as much. He shoveled snow yesterday and is sore today. He complains of intermittent volar swelling. He rates pain 3/10. He is feeling better overall. Past Medical History:   Diagnosis Date    Hypertension        Past Surgical History:   Procedure Laterality Date    BACK SURGERY         Current Outpatient Medications on File Prior to Visit   Medication Sig Dispense Refill    gabapentin (NEURONTIN) 300 MG capsule 900 mg. amLODIPine (NORVASC) 5 MG tablet Take 0.5 tablets by mouth daily 15 tablet 0    naproxen (NAPROSYN) 500 MG tablet Take 1 tablet by mouth 2 times daily for 14 days 28 tablet 0     No current facility-administered medications on file prior to visit.        Allergies   Allergen Reactions    Lisinopril      Other reaction(s): Mood swings    Oxycodone-Acetaminophen      Other reaction(s): Weight gain    Pregabalin      Other reaction(s): Mood swings, Depressive disorder, Mood swings, Depressive disorder    Codeine Itching       Social History     Socioeconomic History    Marital status: Single     Spouse name: Not on file    Number of children: Not on file    Years of education: Not on file    Highest education level: Not on file   Occupational History    Not on file   Tobacco Use    Smoking status: Never    Smokeless tobacco: Never   Substance and Sexual Activity    Alcohol use: Not on file    Drug use: Not on file    Sexual activity: Not on file   Other Topics Concern    Not on file   Social History Narrative    Not on file     Social Determinants of Health     Financial Resource Strain: Not on file   Food Insecurity: Not on file   Transportation Needs: Not on file   Physical Activity: Not on file   Stress: Not on file   Social Connections: Not on file   Intimate Partner Violence: Not on file   Housing Stability: Not on file       History reviewed. No pertinent family history. Review of Systems:  I have reviewed the clinically relevant past medical history, medications, allergies, family history, social history, and 13 point Review of Systems from the patient's recent history form & documented any details relevant to today's presenting complaints in the history above. The patient's self-reported past medical history, medications, allergies, family history, social history, and Review of Systems form from 11/21/22 have been scanned into the chart under the \"Media\" tab. Physical Examination:     Mr. José Miguel Lee is a pleasant 78 y.o. male who presents today in no acute distress, awake, alert, and oriented. Vitals:    01/23/23 1112   Resp: 16           There is swelling that can be seen, no change in the color. He has intact sensation to light touch throughout the bilateral hands in the median, radial, and ulnar nerve distribution and good radial pulses. EPL/FPL/Interossei are intact bilaterally. He is non tender at the dorsal wrist over the area of the triquetrum. Non tender to the remainder of the wrist and hand. His strength is 5/5 with wrist flexion, extension and radial/ulnar deviation. IMAGING:  Right wrist Xray's:  3 views reviewed demonstrating   Osseous density projecting along the dorsal aspect of the wrist with   appearance suspicious for a possible fracture of the triquetrum.   No other skeletal abnormality is appreciated in the right hand or wrist.  Mild pattern   of degenerative change at the 1st carpometacarpal joint. There does appear   to be dorsal soft tissue swelling about the wrist.         Assessment:     Right wrist sprain       Plan:     Natural history and expected course discussed. Questions answered. We discussed that he can begin to wean from the brace. I think the pain he is experiencing is due to overuse as he has been doing the HEP three times a day and has been lifting a lot. Recommend backing off the exercises to maybe once daily. Discussed NSAID's and how they may help more than the Tylenol he is using. Follow up in 4-6 weeks as needed. Gemma Lane PA-C  Board Certified by the [employer] on Certification of [address]: This note was generated with use of a verbal recognition program and an attempt was made to check for errors. It is possible that there are still dictated errors within this office note. If so, please bring any significant errors to my attention for an addendum. All efforts were made to ensure that this office note is accurate.

## 2024-05-22 ENCOUNTER — HOSPITAL ENCOUNTER (EMERGENCY)
Age: 69
Discharge: HOME | End: 2024-05-22
Attending: EMERGENCY MEDICINE
Payer: MEDICARE

## 2024-05-22 VITALS
HEART RATE: 85 BPM | HEIGHT: 70 IN | OXYGEN SATURATION: 95 % | DIASTOLIC BLOOD PRESSURE: 75 MMHG | SYSTOLIC BLOOD PRESSURE: 152 MMHG | WEIGHT: 198.85 LBS | BODY MASS INDEX: 28.47 KG/M2 | TEMPERATURE: 97.6 F | RESPIRATION RATE: 14 BRPM

## 2024-05-22 DIAGNOSIS — S41.112A LACERATION OF LEFT UPPER EXTREMITY, INITIAL ENCOUNTER: Primary | ICD-10-CM

## 2024-05-22 PROCEDURE — 99282 EMERGENCY DEPT VISIT SF MDM: CPT

## 2024-05-22 PROCEDURE — 12002 RPR S/N/AX/GEN/TRNK2.6-7.5CM: CPT

## 2024-05-22 ASSESSMENT — PAIN - FUNCTIONAL ASSESSMENT
PAIN_FUNCTIONAL_ASSESSMENT: PREVENTS OR INTERFERES SOME ACTIVE ACTIVITIES AND ADLS
PAIN_FUNCTIONAL_ASSESSMENT: 0-10

## 2024-05-22 ASSESSMENT — PAIN DESCRIPTION - DESCRIPTORS: DESCRIPTORS: DISCOMFORT

## 2024-05-22 ASSESSMENT — PAIN DESCRIPTION - FREQUENCY: FREQUENCY: INTERMITTENT

## 2024-05-22 ASSESSMENT — PAIN DESCRIPTION - ORIENTATION: ORIENTATION: RIGHT

## 2024-05-22 ASSESSMENT — PAIN DESCRIPTION - ONSET: ONSET: SUDDEN

## 2024-05-22 ASSESSMENT — PAIN DESCRIPTION - LOCATION: LOCATION: ARM

## 2024-05-22 ASSESSMENT — PAIN DESCRIPTION - PAIN TYPE: TYPE: ACUTE PAIN

## 2024-05-22 ASSESSMENT — PAIN SCALES - GENERAL: PAINLEVEL_OUTOF10: 2

## 2024-05-23 NOTE — ED NOTES
Provider order placed for patient's discharge. Provider reviewed decision to discharge with the patient. Discharge paperwork and any prescriptions were reviewed with the patient. Patient verbalized understanding of discharge education and any prescriptions and has no further questions or further needs at this time. Patient left with all personal belongings and was stable upon departure. Patient thanked for choosing [facility] and informed to return should any need arise.

## 2024-05-27 NOTE — ED PROVIDER NOTES
use: Yes    Drug use: Never       SCREENINGS        [place] Coma Scale  Eye Opening: Spontaneous  Best Verbal Response: Oriented  Best Motor Response: Obeys commands  [place] Coma Scale Score: 15                CIWA Assessment  BP: (!) 152/75  Pulse: 85           PHYSICAL EXAM  1 or more Elements     ED Triage Vitals [05/22/24 2106]   BP Temp Temp src Pulse Respirations SpO2 Height Weight - Scale   (!) 152/75 97.6 °F (36.4 °C) -- 85 14 95 % 1.778 m (5' 10\") 90.2 kg (198 lb 13.7 oz)       Physical Exam  Vitals reviewed.   Constitutional:       General: He is not in acute distress.     Appearance: He is well-developed.   HENT:      Head: Normocephalic and atraumatic.   Eyes:      Conjunctiva/sclera: Conjunctivae normal.   Neck:      Trachea: No tracheal deviation.   Cardiovascular:      Rate and Rhythm: Normal rate and regular rhythm.      Pulses: Normal pulses.   Pulmonary:      Effort: Pulmonary effort is normal.      Breath sounds: Normal breath sounds. No wheezing or rales.   Abdominal:      General: There is no distension.      Palpations: Abdomen is soft.      Tenderness: There is no abdominal tenderness.   Musculoskeletal:         General: No deformity. Normal range of motion.      Cervical back: Normal range of motion.   Skin:     General: Skin is warm and dry.      Capillary Refill: Capillary refill takes less than 2 seconds.      Findings: Lesion present.   Neurological:      General: No focal deficit present.      Mental Status: He is alert and oriented to person, place, and time.      Sensory: No sensory deficit.      Motor: No weakness.         DIAGNOSTIC RESULTS   LABS:    Labs Reviewed - No data to display    When ordered only abnormal lab results are displayed. All other labs were within normal range or not returned as of this dictation.    EKG:     RADIOLOGY:   Non-plain film images such as CT, Ultrasound and MRI are read by the radiologist. Plain radiographic images are visualized and preliminarily